# Patient Record
Sex: FEMALE | Race: BLACK OR AFRICAN AMERICAN | NOT HISPANIC OR LATINO | URBAN - METROPOLITAN AREA
[De-identification: names, ages, dates, MRNs, and addresses within clinical notes are randomized per-mention and may not be internally consistent; named-entity substitution may affect disease eponyms.]

---

## 2022-12-19 ENCOUNTER — HOSPITAL ENCOUNTER (OUTPATIENT)
Facility: HOSPITAL | Age: 62
Discharge: HOME | End: 2022-12-21
Attending: EMERGENCY MEDICINE | Admitting: HOSPITALIST
Payer: MEDICAID

## 2022-12-19 DIAGNOSIS — R07.9 CHEST PAIN: ICD-10-CM

## 2022-12-19 DIAGNOSIS — R79.89 D-DIMER, ELEVATED: ICD-10-CM

## 2022-12-19 DIAGNOSIS — R93.89 ABNORMAL CT SCAN: ICD-10-CM

## 2022-12-19 DIAGNOSIS — R04.2 COUGH WITH HEMOPTYSIS: ICD-10-CM

## 2022-12-19 DIAGNOSIS — R91.8 LUNG MASS: ICD-10-CM

## 2022-12-19 DIAGNOSIS — R04.2 HEMOPTYSIS: ICD-10-CM

## 2022-12-19 DIAGNOSIS — R06.2 WHEEZING: Primary | ICD-10-CM

## 2022-12-19 DIAGNOSIS — J40 BRONCHITIS: ICD-10-CM

## 2022-12-19 DIAGNOSIS — R06.02 SHORTNESS OF BREATH: ICD-10-CM

## 2022-12-19 DIAGNOSIS — I26.99 PULMONARY EMBOLISM: ICD-10-CM

## 2022-12-19 LAB
ALBUMIN SERPL BCP-MCNC: 3.4 G/DL (ref 3.5–5.2)
ALLENS TEST: ABNORMAL
ALP SERPL-CCNC: 73 U/L (ref 55–135)
ALT SERPL W/O P-5'-P-CCNC: 9 U/L (ref 10–44)
ANION GAP SERPL CALC-SCNC: 11 MMOL/L (ref 8–16)
AST SERPL-CCNC: 20 U/L (ref 10–40)
BASOPHILS # BLD AUTO: 0.04 K/UL (ref 0–0.2)
BASOPHILS NFR BLD: 0.5 % (ref 0–1.9)
BILIRUB SERPL-MCNC: 0.4 MG/DL (ref 0.1–1)
BNP SERPL-MCNC: 15 PG/ML (ref 0–99)
BUN SERPL-MCNC: 7 MG/DL (ref 8–23)
CALCIUM SERPL-MCNC: 9.5 MG/DL (ref 8.7–10.5)
CHLORIDE SERPL-SCNC: 99 MMOL/L (ref 95–110)
CO2 SERPL-SCNC: 26 MMOL/L (ref 23–29)
CREAT SERPL-MCNC: 1.2 MG/DL (ref 0.5–1.4)
D DIMER PPP IA.FEU-MCNC: 2.71 MG/L FEU
DELSYS: ABNORMAL
DIFFERENTIAL METHOD: ABNORMAL
EOSINOPHIL # BLD AUTO: 0.3 K/UL (ref 0–0.5)
EOSINOPHIL NFR BLD: 4.1 % (ref 0–8)
ERYTHROCYTE [DISTWIDTH] IN BLOOD BY AUTOMATED COUNT: 17.7 % (ref 11.5–14.5)
EST. GFR  (NO RACE VARIABLE): 51.2 ML/MIN/1.73 M^2
GLUCOSE SERPL-MCNC: 107 MG/DL (ref 70–110)
HCO3 UR-SCNC: 33.1 MMOL/L (ref 24–28)
HCT VFR BLD AUTO: 33.6 % (ref 37–48.5)
HCV AB SERPL QL IA: NORMAL
HGB BLD-MCNC: 9.6 G/DL (ref 12–16)
HIV 1+2 AB+HIV1 P24 AG SERPL QL IA: NORMAL
IMM GRANULOCYTES # BLD AUTO: 0.03 K/UL (ref 0–0.04)
IMM GRANULOCYTES NFR BLD AUTO: 0.4 % (ref 0–0.5)
INFLUENZA A, MOLECULAR: NOT DETECTED
INFLUENZA B, MOLECULAR: NOT DETECTED
LYMPHOCYTES # BLD AUTO: 2.6 K/UL (ref 1–4.8)
LYMPHOCYTES NFR BLD: 31.4 % (ref 18–48)
MCH RBC QN AUTO: 22.2 PG (ref 27–31)
MCHC RBC AUTO-ENTMCNC: 28.6 G/DL (ref 32–36)
MCV RBC AUTO: 78 FL (ref 82–98)
MONOCYTES # BLD AUTO: 0.7 K/UL (ref 0.3–1)
MONOCYTES NFR BLD: 8.4 % (ref 4–15)
NEUTROPHILS # BLD AUTO: 4.6 K/UL (ref 1.8–7.7)
NEUTROPHILS NFR BLD: 55.2 % (ref 38–73)
NRBC BLD-RTO: 0 /100 WBC
PCO2 BLDA: 55.3 MMHG (ref 35–45)
PH SMN: 7.39 [PH] (ref 7.35–7.45)
PLATELET # BLD AUTO: 367 K/UL (ref 150–450)
PMV BLD AUTO: 10.3 FL (ref 9.2–12.9)
PO2 BLDA: 39 MMHG (ref 40–60)
POC BE: 8 MMOL/L
POC SATURATED O2: 71 % (ref 95–100)
POC TCO2: 35 MMOL/L (ref 24–29)
POTASSIUM SERPL-SCNC: 4.2 MMOL/L (ref 3.5–5.1)
PROT SERPL-MCNC: 8.9 G/DL (ref 6–8.4)
RBC # BLD AUTO: 4.33 M/UL (ref 4–5.4)
RSV AG BY MOLECULAR METHOD: NOT DETECTED
SAMPLE: ABNORMAL
SARS-COV-2 RDRP RESP QL NAA+PROBE: NEGATIVE
SARS-COV-2 RNA RESP QL NAA+PROBE: NOT DETECTED
SITE: ABNORMAL
SODIUM SERPL-SCNC: 136 MMOL/L (ref 136–145)
TROPONIN I SERPL DL<=0.01 NG/ML-MCNC: <0.006 NG/ML (ref 0–0.03)
WBC # BLD AUTO: 8.26 K/UL (ref 3.9–12.7)

## 2022-12-19 PROCEDURE — 36410 VNPNXR 3YR/> PHY/QHP DX/THER: CPT | Mod: 59,,, | Performed by: EMERGENCY MEDICINE

## 2022-12-19 PROCEDURE — 93010 ELECTROCARDIOGRAM REPORT: CPT | Mod: ,,, | Performed by: INTERNAL MEDICINE

## 2022-12-19 PROCEDURE — 80053 COMPREHEN METABOLIC PANEL: CPT | Performed by: EMERGENCY MEDICINE

## 2022-12-19 PROCEDURE — 87389 HIV-1 AG W/HIV-1&-2 AB AG IA: CPT | Performed by: PHYSICIAN ASSISTANT

## 2022-12-19 PROCEDURE — 85379 FIBRIN DEGRADATION QUANT: CPT | Performed by: EMERGENCY MEDICINE

## 2022-12-19 PROCEDURE — 85025 COMPLETE CBC W/AUTO DIFF WBC: CPT | Performed by: EMERGENCY MEDICINE

## 2022-12-19 PROCEDURE — 94640 AIRWAY INHALATION TREATMENT: CPT

## 2022-12-19 PROCEDURE — 84484 ASSAY OF TROPONIN QUANT: CPT | Performed by: EMERGENCY MEDICINE

## 2022-12-19 PROCEDURE — 83880 ASSAY OF NATRIURETIC PEPTIDE: CPT | Performed by: EMERGENCY MEDICINE

## 2022-12-19 PROCEDURE — 76937 US GUIDE VASCULAR ACCESS: CPT | Mod: 26,,, | Performed by: EMERGENCY MEDICINE

## 2022-12-19 PROCEDURE — 93005 ELECTROCARDIOGRAM TRACING: CPT

## 2022-12-19 PROCEDURE — 94761 N-INVAS EAR/PLS OXIMETRY MLT: CPT

## 2022-12-19 PROCEDURE — 63600175 PHARM REV CODE 636 W HCPCS: Performed by: EMERGENCY MEDICINE

## 2022-12-19 PROCEDURE — 76937 PR  US GUIDE, VASCULAR ACCESS: ICD-10-PCS | Mod: 26,,, | Performed by: EMERGENCY MEDICINE

## 2022-12-19 PROCEDURE — U0002 COVID-19 LAB TEST NON-CDC: HCPCS | Performed by: PHYSICIAN ASSISTANT

## 2022-12-19 PROCEDURE — 99285 EMERGENCY DEPT VISIT HI MDM: CPT | Mod: 25

## 2022-12-19 PROCEDURE — 0241U SARS-COV2 (COVID) WITH FLU/RSV BY PCR: CPT | Performed by: EMERGENCY MEDICINE

## 2022-12-19 PROCEDURE — 93010 EKG 12-LEAD: ICD-10-PCS | Mod: ,,, | Performed by: INTERNAL MEDICINE

## 2022-12-19 PROCEDURE — 99900035 HC TECH TIME PER 15 MIN (STAT)

## 2022-12-19 PROCEDURE — 25000242 PHARM REV CODE 250 ALT 637 W/ HCPCS: Performed by: EMERGENCY MEDICINE

## 2022-12-19 PROCEDURE — 82803 BLOOD GASES ANY COMBINATION: CPT

## 2022-12-19 PROCEDURE — 99285 EMERGENCY DEPT VISIT HI MDM: CPT | Mod: 25,CS,, | Performed by: EMERGENCY MEDICINE

## 2022-12-19 PROCEDURE — 25500020 PHARM REV CODE 255: Performed by: EMERGENCY MEDICINE

## 2022-12-19 PROCEDURE — 86803 HEPATITIS C AB TEST: CPT | Performed by: PHYSICIAN ASSISTANT

## 2022-12-19 PROCEDURE — 36410 PR VENIPUNC NEED PHYS SKILL,DX OR RX: ICD-10-PCS | Mod: 59,,, | Performed by: EMERGENCY MEDICINE

## 2022-12-19 PROCEDURE — 99285 PR EMERGENCY DEPT VISIT,LEVEL V: ICD-10-PCS | Mod: 25,CS,, | Performed by: EMERGENCY MEDICINE

## 2022-12-19 RX ORDER — IPRATROPIUM BROMIDE AND ALBUTEROL SULFATE 2.5; .5 MG/3ML; MG/3ML
3 SOLUTION RESPIRATORY (INHALATION)
Status: COMPLETED | OUTPATIENT
Start: 2022-12-19 | End: 2022-12-19

## 2022-12-19 RX ORDER — ALBUTEROL SULFATE 2.5 MG/.5ML
5 SOLUTION RESPIRATORY (INHALATION)
Status: COMPLETED | OUTPATIENT
Start: 2022-12-19 | End: 2022-12-19

## 2022-12-19 RX ORDER — PREDNISONE 20 MG/1
60 TABLET ORAL
Status: COMPLETED | OUTPATIENT
Start: 2022-12-19 | End: 2022-12-19

## 2022-12-19 RX ADMIN — IPRATROPIUM BROMIDE AND ALBUTEROL SULFATE 3 ML: .5; 3 SOLUTION RESPIRATORY (INHALATION) at 03:12

## 2022-12-19 RX ADMIN — PREDNISONE 60 MG: 20 TABLET ORAL at 02:12

## 2022-12-19 RX ADMIN — ALBUTEROL SULFATE 5 MG: 2.5 SOLUTION RESPIRATORY (INHALATION) at 07:12

## 2022-12-19 RX ADMIN — IPRATROPIUM BROMIDE AND ALBUTEROL SULFATE 3 ML: 2.5; .5 SOLUTION RESPIRATORY (INHALATION) at 01:12

## 2022-12-19 RX ADMIN — IOHEXOL 100 ML: 350 INJECTION, SOLUTION INTRAVENOUS at 09:12
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
## 2022-12-20 PROBLEM — E66.01 MORBID OBESITY WITH BODY MASS INDEX OF 60.0-69.9 IN ADULT: Chronic | Status: ACTIVE | Noted: 2022-12-20

## 2022-12-20 PROBLEM — I10 HYPERTENSION: Chronic | Status: ACTIVE | Noted: 2022-12-20

## 2022-12-20 PROBLEM — R91.8 MASS OF RIGHT LUNG: Status: ACTIVE | Noted: 2022-12-20

## 2022-12-20 PROBLEM — D86.9 SARCOIDOSIS: Status: ACTIVE | Noted: 2022-12-20

## 2022-12-20 PROBLEM — E66.01 MORBID OBESITY WITH BODY MASS INDEX OF 60.0-69.9 IN ADULT: Status: ACTIVE | Noted: 2022-12-20

## 2022-12-20 PROBLEM — R06.09 DYSPNEA ON EXERTION: Status: ACTIVE | Noted: 2022-12-20

## 2022-12-20 PROBLEM — J44.9 COPD (CHRONIC OBSTRUCTIVE PULMONARY DISEASE): Status: ACTIVE | Noted: 2022-12-20

## 2022-12-20 PROBLEM — I10 HYPERTENSION: Status: ACTIVE | Noted: 2022-12-20

## 2022-12-20 PROBLEM — R04.2 HEMOPTYSIS: Status: ACTIVE | Noted: 2022-12-20

## 2022-12-20 PROBLEM — D64.9 ANEMIA: Status: ACTIVE | Noted: 2022-12-20

## 2022-12-20 LAB
ALBUMIN SERPL BCP-MCNC: 3.4 G/DL (ref 3.5–5.2)
ALP SERPL-CCNC: 75 U/L (ref 55–135)
ALT SERPL W/O P-5'-P-CCNC: 10 U/L (ref 10–44)
ANION GAP SERPL CALC-SCNC: 12 MMOL/L (ref 8–16)
ASCENDING AORTA: 2.7 CM
AST SERPL-CCNC: 15 U/L (ref 10–40)
AV INDEX (PROSTH): 0.94
AV MEAN GRADIENT: 2 MMHG
AV PEAK GRADIENT: 3 MMHG
AV VALVE AREA: 4.36 CM2
AV VELOCITY RATIO: 0.85
BASOPHILS # BLD AUTO: 0.01 K/UL (ref 0–0.2)
BASOPHILS NFR BLD: 0.1 % (ref 0–1.9)
BILIRUB SERPL-MCNC: 0.3 MG/DL (ref 0.1–1)
BSA FOR ECHO PROCEDURE: 2.91 M2
BUN SERPL-MCNC: 13 MG/DL (ref 8–23)
CALCIUM SERPL-MCNC: 9.5 MG/DL (ref 8.7–10.5)
CHLORIDE SERPL-SCNC: 101 MMOL/L (ref 95–110)
CO2 SERPL-SCNC: 24 MMOL/L (ref 23–29)
CREAT SERPL-MCNC: 1.3 MG/DL (ref 0.5–1.4)
CV ECHO LV RWT: 0.33 CM
DIFFERENTIAL METHOD: ABNORMAL
DOP CALC AO PEAK VEL: 0.91 M/S
DOP CALC AO VTI: 16.42 CM
DOP CALC LVOT AREA: 4.6 CM2
DOP CALC LVOT DIAMETER: 2.43 CM
DOP CALC LVOT PEAK VEL: 0.77 M/S
DOP CALC LVOT STROKE VOLUME: 71.66 CM3
DOP CALCLVOT PEAK VEL VTI: 15.46 CM
E WAVE DECELERATION TIME: 300.51 MSEC
E/A RATIO: 0.93
E/E' RATIO: 10.27 M/S
ECHO LV POSTERIOR WALL: 0.87 CM (ref 0.6–1.1)
EJECTION FRACTION: 65 %
EOSINOPHIL # BLD AUTO: 0 K/UL (ref 0–0.5)
EOSINOPHIL NFR BLD: 0.1 % (ref 0–8)
ERYTHROCYTE [DISTWIDTH] IN BLOOD BY AUTOMATED COUNT: 17.8 % (ref 11.5–14.5)
EST. GFR  (NO RACE VARIABLE): 46.5 ML/MIN/1.73 M^2
ESTIMATED AVG GLUCOSE: 120 MG/DL (ref 68–131)
FERRITIN SERPL-MCNC: 15 NG/ML (ref 20–300)
FOLATE SERPL-MCNC: 5.2 NG/ML (ref 4–24)
FRACTIONAL SHORTENING: 29 % (ref 28–44)
GLUCOSE SERPL-MCNC: 145 MG/DL (ref 70–110)
HBA1C MFR BLD: 5.8 % (ref 4–5.6)
HCT VFR BLD AUTO: 34.8 % (ref 37–48.5)
HGB BLD-MCNC: 10.1 G/DL (ref 12–16)
IMM GRANULOCYTES # BLD AUTO: 0.04 K/UL (ref 0–0.04)
IMM GRANULOCYTES NFR BLD AUTO: 0.4 % (ref 0–0.5)
INTERVENTRICULAR SEPTUM: 0.66 CM (ref 0.6–1.1)
IRON SERPL-MCNC: 31 UG/DL (ref 30–160)
LA MAJOR: 4.08 CM
LA WIDTH: 3.59 CM
LEFT ATRIUM SIZE: 3.08 CM
LEFT ATRIUM VOLUME INDEX MOD: 13.3 ML/M2
LEFT ATRIUM VOLUME MOD: 35.79 CM3
LEFT INTERNAL DIMENSION IN SYSTOLE: 3.72 CM (ref 2.1–4)
LEFT VENTRICLE DIASTOLIC VOLUME INDEX: 48.38 ML/M2
LEFT VENTRICLE DIASTOLIC VOLUME: 130.15 ML
LEFT VENTRICLE MASS INDEX: 51 G/M2
LEFT VENTRICLE SYSTOLIC VOLUME INDEX: 21.9 ML/M2
LEFT VENTRICLE SYSTOLIC VOLUME: 58.93 ML
LEFT VENTRICULAR INTERNAL DIMENSION IN DIASTOLE: 5.21 CM (ref 3.5–6)
LEFT VENTRICULAR MASS: 137.68 G
LV LATERAL E/E' RATIO: 9.63 M/S
LV SEPTAL E/E' RATIO: 11 M/S
LYMPHOCYTES # BLD AUTO: 1.4 K/UL (ref 1–4.8)
LYMPHOCYTES NFR BLD: 14.2 % (ref 18–48)
MAGNESIUM SERPL-MCNC: 1.9 MG/DL (ref 1.6–2.6)
MCH RBC QN AUTO: 21.9 PG (ref 27–31)
MCHC RBC AUTO-ENTMCNC: 29 G/DL (ref 32–36)
MCV RBC AUTO: 75 FL (ref 82–98)
MONOCYTES # BLD AUTO: 0.4 K/UL (ref 0.3–1)
MONOCYTES NFR BLD: 3.6 % (ref 4–15)
MV PEAK A VEL: 0.83 M/S
MV PEAK E VEL: 0.77 M/S
MV STENOSIS PRESSURE HALF TIME: 87.15 MS
MV VALVE AREA P 1/2 METHOD: 2.52 CM2
NEUTROPHILS # BLD AUTO: 8 K/UL (ref 1.8–7.7)
NEUTROPHILS NFR BLD: 81.6 % (ref 38–73)
NRBC BLD-RTO: 0 /100 WBC
PHOSPHATE SERPL-MCNC: 2.1 MG/DL (ref 2.7–4.5)
PLATELET # BLD AUTO: 333 K/UL (ref 150–450)
PLATELET BLD QL SMEAR: ABNORMAL
PMV BLD AUTO: 10.4 FL (ref 9.2–12.9)
POTASSIUM SERPL-SCNC: 4.5 MMOL/L (ref 3.5–5.1)
PROCALCITONIN SERPL IA-MCNC: 0.02 NG/ML
PROT SERPL-MCNC: 9 G/DL (ref 6–8.4)
RA MAJOR: 4.75 CM
RA WIDTH: 4.03 CM
RBC # BLD AUTO: 4.62 M/UL (ref 4–5.4)
RETICS/RBC NFR AUTO: 1.3 % (ref 0.5–2.5)
RIGHT VENTRICULAR END-DIASTOLIC DIMENSION: 3.22 CM
RV TISSUE DOPPLER FREE WALL SYSTOLIC VELOCITY 1 (APICAL 4 CHAMBER VIEW): 13.62 CM/S
SATURATED IRON: 7 % (ref 20–50)
SINUS: 2.96 CM
SODIUM SERPL-SCNC: 137 MMOL/L (ref 136–145)
STJ: 2.81 CM
TDI LATERAL: 0.08 M/S
TDI SEPTAL: 0.07 M/S
TDI: 0.08 M/S
TOTAL IRON BINDING CAPACITY: 414 UG/DL (ref 250–450)
TRANSFERRIN SERPL-MCNC: 280 MG/DL (ref 200–375)
TRICUSPID ANNULAR PLANE SYSTOLIC EXCURSION: 1.95 CM
VIT B12 SERPL-MCNC: 436 PG/ML (ref 210–950)
WBC # BLD AUTO: 9.85 K/UL (ref 3.9–12.7)

## 2022-12-20 PROCEDURE — 25000242 PHARM REV CODE 250 ALT 637 W/ HCPCS

## 2022-12-20 PROCEDURE — 83036 HEMOGLOBIN GLYCOSYLATED A1C: CPT

## 2022-12-20 PROCEDURE — 94640 AIRWAY INHALATION TREATMENT: CPT

## 2022-12-20 PROCEDURE — 96372 THER/PROPH/DIAG INJ SC/IM: CPT

## 2022-12-20 PROCEDURE — 83735 ASSAY OF MAGNESIUM: CPT

## 2022-12-20 PROCEDURE — 84100 ASSAY OF PHOSPHORUS: CPT

## 2022-12-20 PROCEDURE — 80053 COMPREHEN METABOLIC PANEL: CPT

## 2022-12-20 PROCEDURE — 63600175 PHARM REV CODE 636 W HCPCS

## 2022-12-20 PROCEDURE — 82746 ASSAY OF FOLIC ACID SERUM: CPT

## 2022-12-20 PROCEDURE — G0378 HOSPITAL OBSERVATION PER HR: HCPCS

## 2022-12-20 PROCEDURE — 84466 ASSAY OF TRANSFERRIN: CPT

## 2022-12-20 PROCEDURE — 99900035 HC TECH TIME PER 15 MIN (STAT)

## 2022-12-20 PROCEDURE — 82607 VITAMIN B-12: CPT

## 2022-12-20 PROCEDURE — 25500020 PHARM REV CODE 255: Performed by: HOSPITALIST

## 2022-12-20 PROCEDURE — 85025 COMPLETE CBC W/AUTO DIFF WBC: CPT

## 2022-12-20 PROCEDURE — 27000221 HC OXYGEN, UP TO 24 HOURS

## 2022-12-20 PROCEDURE — 94761 N-INVAS EAR/PLS OXIMETRY MLT: CPT

## 2022-12-20 PROCEDURE — 84145 PROCALCITONIN (PCT): CPT

## 2022-12-20 PROCEDURE — 99204 PR OFFICE/OUTPT VISIT, NEW, LEVL IV, 45-59 MIN: ICD-10-PCS | Mod: ,,, | Performed by: INTERNAL MEDICINE

## 2022-12-20 PROCEDURE — 85045 AUTOMATED RETICULOCYTE COUNT: CPT

## 2022-12-20 PROCEDURE — 25000003 PHARM REV CODE 250

## 2022-12-20 PROCEDURE — 99220 PR INITIAL OBSERVATION CARE,LEVL III: CPT | Mod: ,,, | Performed by: HOSPITALIST

## 2022-12-20 PROCEDURE — 99220 PR INITIAL OBSERVATION CARE,LEVL III: ICD-10-PCS | Mod: ,,, | Performed by: HOSPITALIST

## 2022-12-20 PROCEDURE — 82728 ASSAY OF FERRITIN: CPT

## 2022-12-20 PROCEDURE — 94640 AIRWAY INHALATION TREATMENT: CPT | Mod: XB

## 2022-12-20 PROCEDURE — 99204 OFFICE O/P NEW MOD 45 MIN: CPT | Mod: ,,, | Performed by: INTERNAL MEDICINE

## 2022-12-20 RX ORDER — GLUCAGON 1 MG
1 KIT INJECTION
Status: DISCONTINUED | OUTPATIENT
Start: 2022-12-20 | End: 2022-12-21 | Stop reason: HOSPADM

## 2022-12-20 RX ORDER — ALBUTEROL SULFATE 0.83 MG/ML
2.5 SOLUTION RESPIRATORY (INHALATION) EVERY 6 HOURS PRN
COMMUNITY
Start: 2022-11-04

## 2022-12-20 RX ORDER — HYDROCHLOROTHIAZIDE 12.5 MG/1
25 TABLET ORAL DAILY
Status: DISCONTINUED | OUTPATIENT
Start: 2022-12-20 | End: 2022-12-21 | Stop reason: HOSPADM

## 2022-12-20 RX ORDER — ALBUTEROL SULFATE 90 UG/1
2 AEROSOL, METERED RESPIRATORY (INHALATION) EVERY 6 HOURS PRN
Status: DISCONTINUED | OUTPATIENT
Start: 2022-12-20 | End: 2022-12-21

## 2022-12-20 RX ORDER — IBUPROFEN 200 MG
24 TABLET ORAL
Status: DISCONTINUED | OUTPATIENT
Start: 2022-12-20 | End: 2022-12-21 | Stop reason: HOSPADM

## 2022-12-20 RX ORDER — NALOXONE HCL 0.4 MG/ML
0.02 VIAL (ML) INJECTION
Status: DISCONTINUED | OUTPATIENT
Start: 2022-12-20 | End: 2022-12-21 | Stop reason: HOSPADM

## 2022-12-20 RX ORDER — SODIUM CHLORIDE 0.9 % (FLUSH) 0.9 %
10 SYRINGE (ML) INJECTION EVERY 12 HOURS PRN
Status: DISCONTINUED | OUTPATIENT
Start: 2022-12-20 | End: 2022-12-21 | Stop reason: HOSPADM

## 2022-12-20 RX ORDER — ONDANSETRON 2 MG/ML
4 INJECTION INTRAMUSCULAR; INTRAVENOUS EVERY 8 HOURS PRN
Status: DISCONTINUED | OUTPATIENT
Start: 2022-12-20 | End: 2022-12-21 | Stop reason: HOSPADM

## 2022-12-20 RX ORDER — ENOXAPARIN SODIUM 100 MG/ML
40 INJECTION SUBCUTANEOUS EVERY 24 HOURS
Status: DISCONTINUED | OUTPATIENT
Start: 2022-12-20 | End: 2022-12-20

## 2022-12-20 RX ORDER — LEVOTHYROXINE SODIUM 100 UG/1
1 CAPSULE ORAL DAILY
COMMUNITY
Start: 2022-12-08

## 2022-12-20 RX ORDER — MOMETASONE FUROATE AND FORMOTEROL FUMARATE DIHYDRATE 200; 5 UG/1; UG/1
2 AEROSOL RESPIRATORY (INHALATION) 2 TIMES DAILY
COMMUNITY

## 2022-12-20 RX ORDER — ACETAMINOPHEN 325 MG/1
650 TABLET ORAL EVERY 4 HOURS PRN
Status: DISCONTINUED | OUTPATIENT
Start: 2022-12-20 | End: 2022-12-21 | Stop reason: HOSPADM

## 2022-12-20 RX ORDER — MELOXICAM 15 MG/1
15 TABLET ORAL DAILY
COMMUNITY

## 2022-12-20 RX ORDER — ENOXAPARIN SODIUM 100 MG/ML
60 INJECTION SUBCUTANEOUS EVERY 12 HOURS
Status: DISCONTINUED | OUTPATIENT
Start: 2022-12-20 | End: 2022-12-21 | Stop reason: HOSPADM

## 2022-12-20 RX ORDER — TRIAMTERENE/HYDROCHLOROTHIAZID 37.5-25 MG
1 TABLET ORAL DAILY
COMMUNITY

## 2022-12-20 RX ORDER — IPRATROPIUM BROMIDE AND ALBUTEROL SULFATE 2.5; .5 MG/3ML; MG/3ML
3 SOLUTION RESPIRATORY (INHALATION)
Status: DISCONTINUED | OUTPATIENT
Start: 2022-12-20 | End: 2022-12-21

## 2022-12-20 RX ORDER — IPRATROPIUM BROMIDE AND ALBUTEROL SULFATE 2.5; .5 MG/3ML; MG/3ML
3 SOLUTION RESPIRATORY (INHALATION) EVERY 4 HOURS PRN
Status: DISCONTINUED | OUTPATIENT
Start: 2022-12-20 | End: 2022-12-21 | Stop reason: HOSPADM

## 2022-12-20 RX ORDER — IBUPROFEN 200 MG
16 TABLET ORAL
Status: DISCONTINUED | OUTPATIENT
Start: 2022-12-20 | End: 2022-12-21 | Stop reason: HOSPADM

## 2022-12-20 RX ORDER — ALBUTEROL SULFATE 90 UG/1
2 AEROSOL, METERED RESPIRATORY (INHALATION) EVERY 6 HOURS PRN
COMMUNITY

## 2022-12-20 RX ORDER — IPRATROPIUM BROMIDE AND ALBUTEROL SULFATE 2.5; .5 MG/3ML; MG/3ML
SOLUTION RESPIRATORY (INHALATION)
Status: COMPLETED
Start: 2022-12-20 | End: 2022-12-20

## 2022-12-20 RX ADMIN — ENOXAPARIN SODIUM 60 MG: 40 INJECTION SUBCUTANEOUS at 09:12

## 2022-12-20 RX ADMIN — IPRATROPIUM BROMIDE AND ALBUTEROL SULFATE 3 ML: 2.5; .5 SOLUTION RESPIRATORY (INHALATION) at 02:12

## 2022-12-20 RX ADMIN — DIBASIC SODIUM PHOSPHATE, MONOBASIC POTASSIUM PHOSPHATE AND MONOBASIC SODIUM PHOSPHATE 2 TABLET: 852; 155; 130 TABLET ORAL at 09:12

## 2022-12-20 RX ADMIN — HYDROCHLOROTHIAZIDE 25 MG: 25 TABLET ORAL at 09:12

## 2022-12-20 RX ADMIN — IPRATROPIUM BROMIDE AND ALBUTEROL SULFATE 3 ML: .5; 3 SOLUTION RESPIRATORY (INHALATION) at 03:12

## 2022-12-20 RX ADMIN — IPRATROPIUM BROMIDE AND ALBUTEROL SULFATE 3 ML: 2.5; .5 SOLUTION RESPIRATORY (INHALATION) at 07:12

## 2022-12-20 RX ADMIN — ENOXAPARIN SODIUM 60 MG: 40 INJECTION SUBCUTANEOUS at 10:12

## 2022-12-20 RX ADMIN — HUMAN ALBUMIN MICROSPHERES AND PERFLUTREN 0.66 MG: 10; .22 INJECTION, SOLUTION INTRAVENOUS at 09:12

## 2022-12-20 NOTE — HPI
"Ara Duffy is a 62 year old woman with ?sarcoidosis, asthma/COPD, HTN, who presents for hemoptysis and SIMONS that started Thursday. Patient has never been in the hospital and receives most of her care at [facility], which we cannot see.     Since Thursday patient has noted small streaks of blood in her sputum, about <1 tsp. Denies shortness of breath at rest, but has SIMONS which is normal for her. Patient has not noted significant changes in the quality of sputum otherwise, eg quantity or colour. Denies fevers, chills, weight loss recently, night sweats, but notes some decreased appetite in the last month. Patient unable to clarify her diagnosis of sarcoidosis. She states that sarcoidosis has affected parts of her skin by causing excess tissue, but cannot say if she has lung or cardiac involvement of sarcoid.    Patient is an ex smoker with about 60 pack years, quit October this year. Does not drink or use other drugs. She has never undergone cancer screening but there does not seem to be a strong family history of cancer. She says she was supposed to get a "lung scan" done at [facility] next year. She lives with her daughter, who performs most of her ADLs for her. At baseline she is not very mobile.    In the ED, pt was hypertensive but had stable vitals. Labs notable for anemia and D dimer elevation >2. Given her reported history, there was concern for PE. CTA was completed but nondiagnostic owing to contrast extravasation. CT did however, show a new R lung mass: Large spiculated masslike opacity in the right upper lobe with surrounding tree-in-bud nodularity extending to the right lung apex, and occlusion of the right upper lobe bronchus.  Findings are concerning for a neoplastic process.  Recommend further evaluation with PET-CT/tissue sampling, versus three-month follow-up chest CT at a minimum.    Given her likely malignancy and concern for PE, patient was admitted to medicine for observation.  "

## 2022-12-20 NOTE — ASSESSMENT & PLAN NOTE
61 yo F with unclear PMHx including asthma vs COPD, sarcoidosis, presenting with several days of SOB/SIMONS and hemoptysis. However on my interview, patient says her SOB is at baseline. She is usually not very mobile and gets SIMONS easily, so this is not new for her. O2sat within normal with her very likely diagnosis of COPD.     Her CTA findings are nondiagnostic for PE but highly suggestive of lung cancer, which in turn would increase the risk of PE. USS DVT study negative. D-dimer elevation can be explained by possible lung cancer.  Her SOB/SIMONS is probably a mix of her existing lung disease, deconditioning, obesity hypoventilation, and ?cancer.   Patient appears clinically well.  My suspicion for PE is moderate at best.  If she has a PE, PESI score is 102 which is intermediate risk.  Troponin and BNP wnl    Plan  - DVT prophylaxis for now, electing not to start therapeutic anticoagulation as patient appears clinically well  - Has received IV suboptimal contrast 2x today  - Needs to wait another 24hr before attempting CTA again  - VQ scan would not be helpful in this patient  - Echocardiogram for ?right heart strain, a bedside echo could not be done given body habitus  - Duonebs Q6

## 2022-12-20 NOTE — ASSESSMENT & PLAN NOTE
Not sure about this diagnosis, as no prior records can be viewed, pt unable to tell me with any certainty which organs are affected.  - Currently not on therapy, has not been treated in 20 years per patient  - F/u with her provider at [facility]  - Do not think her lung mass is sarcoid

## 2022-12-20 NOTE — ED NOTES
Anesthesia Evaluation     Patient summary reviewed and Nursing notes reviewed   no history of anesthetic complications:  NPO Solid Status: > 8 hours  NPO Liquid Status: > 2 hours           Airway   Mallampati: I  TM distance: >3 FB  Neck ROM: full  No difficulty expected  Dental      Pulmonary    (-) sleep apnea, not a smoker  Cardiovascular   Exercise tolerance: good (4-7 METS)    (-) hypertension, CAD      Neuro/Psych  GI/Hepatic/Renal/Endo    (-) liver disease, no renal disease, diabetes    Musculoskeletal     Abdominal    Substance History      OB/GYN          Other   arthritis,                      Anesthesia Plan    ASA 1     MAC     intravenous induction     Anesthetic plan, all risks, benefits, and alternatives have been provided, discussed and informed consent has been obtained with: patient.       Spoke with ANA Gordon who states she will call back shortly for report as nurse is assisting another patient at this moment.

## 2022-12-20 NOTE — PROVIDER PROGRESS NOTES - EMERGENCY DEPT.
Encounter Date: 12/19/2022    ED Physician Progress Notes        Physician Note:   Received patient at sign-out    Labs remarkable for an elevated D-dimer.  CTA chest ordered.  Patient seen and examined by me and informed of plan.  She has minimally increased work of breathing on RA.  Will administer breathing treatment.    Reassessment:  CTA limited by contrast bolus timing.  Bilateral lower extremity venous ultrasounds ordered.  On repeat assessment, patient reports some improvement with breathing treatments.  She was informed of the mass on the CT in the importance of outpatient follow-up.  At this time, my shift is coming to a close and the patient was signed out to incoming MD.

## 2022-12-20 NOTE — ED NOTES
Pt PIV infiltrated in CT scan. US trained RN at bedside. Ct able to obtain dry scan at this time.

## 2022-12-20 NOTE — PROVIDER PROGRESS NOTES - EMERGENCY DEPT.
"Signout Note    I received signout from the previous provider.     Chief complaint:  Hemoptysis (Pt c/o "coughing up blood, SOB and headache" since Thursday.  )      Pertinent history &exam:  Ara Duffy is a 62 y.o. female with pertinent PMH of COPD, hypertension, morbid obesity, sarcoidosis who presented to emergency department with complaint of hemoptysis.  She was apparently also hypoxic.  Two attempts at CT PE were done, both with contrast extravasation.  Unable to get additional CT PE according to sign-out.  She did have an elevated D-dimer.  Her CT scan showed a spiculated lung mass.  She was signed out pending bilateral lower extremity ultrasounds.    Vitals:    12/20/22 0303   BP: (!) 152/74   Pulse: 94   Resp: (!) 23   Temp: 98.2 °F (36.8 °C)       Imaging Studies:    US Lower Extremity Veins Bilateral   Final Result      No evidence of deep venous thrombosis in either lower extremity.         Electronically signed by: Hudson Darnell MD   Date:    12/20/2022   Time:    00:53      CTA Chest Non-Coronary (PE Studies)   Final Result      1. Examination is nondiagnostic for pulmonary embolism.  Consider repeating the study if clinically indicated.   2. Large spiculated masslike opacity in the right upper lobe with surrounding tree-in-bud nodularity extending to the right lung apex, and occlusion of the right upper lobe bronchus.  Findings are concerning for a neoplastic process.  Recommend further evaluation with PET-CT/tissue sampling, versus three-month follow-up chest CT at a minimum.         Electronically signed by: Kd Farr   Date:    12/19/2022   Time:    22:17      X-Ray Chest AP Portable   Final Result      Right midlung zone opacity concerning for airspace disease including pneumonia in this patient with shortness of breath.  Short-term follow-up chest radiography after therapy is recommended to ensure resolution.      Left costophrenic angle not well visualized which may be related to artifact " versus trace pleural fluid.         Electronically signed by: Camacho Lemus MD   Date:    12/19/2022   Time:    18:29          Medications Given:  Medications   albuterol-ipratropium 2.5 mg-0.5 mg/3 mL nebulizer solution 3 mL (has no administration in time range)   sodium chloride 0.9% flush 10 mL (has no administration in time range)   naloxone 0.4 mg/mL injection 0.02 mg (has no administration in time range)   glucose chewable tablet 16 g (has no administration in time range)   glucose chewable tablet 24 g (has no administration in time range)   glucagon (human recombinant) injection 1 mg (has no administration in time range)   dextrose 10% bolus 125 mL 125 mL (has no administration in time range)   dextrose 10% bolus 250 mL 250 mL (has no administration in time range)   ondansetron injection 4 mg (has no administration in time range)   acetaminophen tablet 650 mg (has no administration in time range)   albuterol-ipratropium 2.5 mg-0.5 mg/3 mL nebulizer solution 3 mL (has no administration in time range)   enoxaparin injection 60 mg (has no administration in time range)   hydroCHLOROthiazide tablet 25 mg (has no administration in time range)   albuterol-ipratropium 2.5 mg-0.5 mg/3 mL nebulizer solution 3 mL (3 mLs Nebulization Given 12/19/22 1331)   predniSONE tablet 60 mg (60 mg Oral Given 12/19/22 1403)   albuterol-ipratropium 2.5 mg-0.5 mg/3 mL nebulizer solution 3 mL (3 mLs Nebulization Given 12/19/22 1515)   albuterol sulfate nebulizer solution 5 mg (5 mg Nebulization Given 12/19/22 1917)   iohexoL (OMNIPAQUE 350) injection 125 mL (100 mLs Intravenous Given 12/19/22 2104)   albuterol-ipratropium (DUO-NEB) 2.5 mg-0.5 mg/3 mL nebulizer solution (3 mLs  Given 12/20/22 0302)       Pending Items/ MDM:  62 y.o. female with above complaint.  Ultrasounds negative.  Given that we have not actually ruled out pulmonary embolism, I do not feel comfortable discharging her home.  I feel she is actually rather high risk  given her spiculated lung mass.  Plan observation to Internal Medicine for reimaging.    Diagnostic Impression:    1. Wheezing    2. Shortness of breath    3. Cough with hemoptysis    4. D-dimer, elevated    5. Bronchitis    6. Abnormal CT scan    7. Lung mass    8. Chest pain    9. Pulmonary embolism         ED Disposition Condition    Observation Stable               Patient  understands the plan and is in agreement, verbalized understanding, questions answered    Ericka Bonner MD  Emergency Medicine

## 2022-12-20 NOTE — ASSESSMENT & PLAN NOTE
Pt reports she takes HCTZ-triamterene at home for fluid in her legs, though her BP is significantly elevated and she looks euvolemic to me. Last took this on Thursday.     - Resume HCTZ 25mg daily  - Pt reports some incontinence, if HCTZ cannot be tolerated, could try CCB instead  - Monitor renal function, we have no prior and her admission Cr is 1.2

## 2022-12-20 NOTE — H&P
"[facility] - Emergency Dept  [facility] Medicine  History & Physical    Patient Name: Ara Duffy  MRN: [id number]  Patient Class: OP- Observation  Admission Date: 12/19/2022  Attending Physician: Beto Dietz MD   Primary Care Provider: No primary care provider on file.         Patient information was obtained from patient and ER records.     Subjective:     Principal Problem:Dyspnea on exertion    Chief Complaint:   Chief Complaint   Patient presents with    Hemoptysis     Pt c/o "coughing up blood, SOB and headache" since Thursday.          HPI: Ara Duffy is a 62 year old woman with ?sarcoidosis, asthma/COPD, HTN, who presents for hemoptysis and SIMONS that started Thursday. Patient has never been in the hospital and receives most of her care at [facility], which we cannot see.     Since Thursday patient has noted small streaks of blood in her sputum, about <1 tsp. Denies shortness of breath at rest, but has SIMONS which is normal for her. Patient has not noted significant changes in the quality of sputum otherwise, eg quantity or colour. Denies fevers, chills, weight loss recently, night sweats, but notes some decreased appetite in the last month. Patient unable to clarify her diagnosis of sarcoidosis. She states that sarcoidosis has affected parts of her skin by causing excess tissue, but cannot say if she has lung or cardiac involvement of sarcoid.    Patient is an ex smoker with about 60 pack years, quit October this year. Does not drink or use other drugs. She has never undergone cancer screening but there does not seem to be a strong family history of cancer. She says she was supposed to get a "lung scan" done at [facility] next year. She lives with her daughter, who performs most of her ADLs for her. At baseline she is not very mobile.    In the ED, pt was hypertensive but had stable vitals. Labs notable for anemia and D dimer elevation >2. Given her reported history, there was concern for PE. CTA was completed but " nondiagnostic owing to contrast extravasation. CT did however, show a new R lung mass: Large spiculated masslike opacity in the right upper lobe with surrounding tree-in-bud nodularity extending to the right lung apex, and occlusion of the right upper lobe bronchus.  Findings are concerning for a neoplastic process.  Recommend further evaluation with PET-CT/tissue sampling, versus three-month follow-up chest CT at a minimum.    Given her likely malignancy and concern for PE, patient was admitted to medicine for observation.      Past Medical History:   Diagnosis Date    COPD (chronic obstructive pulmonary disease)     Essential (primary) hypertension     Mild intermittent asthma, uncomplicated     Obesity, unspecified     Sarcoidosis, unspecified        History reviewed. No pertinent surgical history.    Review of patient's allergies indicates:  No Known Allergies    No current facility-administered medications on file prior to encounter.     Current Outpatient Medications on File Prior to Encounter   Medication Sig    albuterol (PROVENTIL HFA) 90 mcg/actuation inhaler Inhale 2 puffs into the lungs every 6 (six) hours as needed for Wheezing. Rescue    meloxicam (MOBIC) 15 MG tablet Take 15 mg by mouth once daily.    mometasone-formoterol (DULERA) 200-5 mcg/actuation inhaler Inhale 2 puffs into the lungs 2 (two) times daily. Controller    triamterene-hydrochlorothiazide 37.5-25 mg (MAXZIDE-25) 37.5-25 mg per tablet Take 1 tablet by mouth once daily.     Family History       Problem Relation (Age of Onset)    No Known Problems Mother, Father          Tobacco Use    Smoking status: Former     Packs/day: 2.00     Years: 30.00     Pack years: 60.00     Types: Cigarettes     Quit date: 10/10/2022     Years since quittin.1    Smokeless tobacco: Never   Substance and Sexual Activity    Alcohol use: Not Currently    Drug use: Not on file    Sexual activity: Not on file     Review of Systems   Constitutional:  Negative  for activity change, chills, fatigue, fever and unexpected weight change.   HENT: Negative.     Eyes: Negative.    Respiratory:  Positive for shortness of breath.         Hemoptysis   Cardiovascular: Negative.    Gastrointestinal:  Negative for abdominal distention and abdominal pain.   Genitourinary:  Positive for frequency (urinary incontinence). Negative for difficulty urinating.   Musculoskeletal: Negative.    Neurological: Negative.    Psychiatric/Behavioral: Negative.     Objective:     Vital Signs (Most Recent):  Temp: 98.2 °F (36.8 °C) (12/20/22 0303)  Pulse: 94 (12/20/22 0303)  Resp: (!) 23 (12/20/22 0303)  BP: (!) 152/74 (12/20/22 0303)  SpO2: 100 % (12/20/22 0303)   Vital Signs (24h Range):  Temp:  [97.8 °F (36.6 °C)-98.2 °F (36.8 °C)] 98.2 °F (36.8 °C)  Pulse:  [85-99] 94  Resp:  [16-24] 23  SpO2:  [91 %-100 %] 100 %  BP: (152-189)/(74-93) 152/74     Weight: (!) 181.9 kg (401 lb)  Body mass index is 64.72 kg/m².    Physical Exam  Constitutional:       General: She is not in acute distress.     Appearance: She is obese. She is not ill-appearing.   HENT:      Mouth/Throat:      Mouth: Mucous membranes are moist.   Eyes:      Extraocular Movements: Extraocular movements intact.      Pupils: Pupils are equal, round, and reactive to light.   Cardiovascular:      Rate and Rhythm: Normal rate and regular rhythm.      Pulses: Normal pulses.      Heart sounds: Normal heart sounds.   Pulmonary:      Effort: Pulmonary effort is normal. No respiratory distress.      Breath sounds: Wheezing (bilaterally, R>L) present. No rhonchi or rales.   Abdominal:      Palpations: Abdomen is soft.      Tenderness: There is no abdominal tenderness.   Musculoskeletal:         General: No swelling.      Right lower leg: No edema.      Left lower leg: No edema.   Skin:     General: Skin is warm and dry.   Neurological:      Mental Status: She is alert and oriented to person, place, and time. Mental status is at baseline.    Psychiatric:         Mood and Affect: Mood normal.         CRANIAL NERVES     CN III, IV, VI   Pupils are equal, round, and reactive to light.     Significant Labs: All pertinent labs within the past 24 hours have been reviewed.  CBC:   Recent Labs   Lab 12/19/22  1356   WBC 8.26   HGB 9.6*   HCT 33.6*        CMP:   Recent Labs   Lab 12/19/22  1356      K 4.2   CL 99   CO2 26      BUN 7*   CREATININE 1.2   CALCIUM 9.5   PROT 8.9*   ALBUMIN 3.4*   BILITOT 0.4   ALKPHOS 73   AST 20   ALT 9*   ANIONGAP 11     Troponin:   Recent Labs   Lab 12/19/22  1356   TROPONINI <0.006       Significant Imaging: I have reviewed all pertinent imaging results/findings within the past 24 hours.    CTA chest non coronary:  Impression:     1. Examination is nondiagnostic for pulmonary embolism.  Consider repeating the study if clinically indicated.  2. Large spiculated masslike opacity in the right upper lobe with surrounding tree-in-bud nodularity extending to the right lung apex, and occlusion of the right upper lobe bronchus.  Findings are concerning for a neoplastic process.  Recommend further evaluation with PET-CT/tissue sampling, versus three-month follow-up chest CT at a minimum.    Assessment/Plan:     * Dyspnea on exertion  63 yo F with unclear PMHx including asthma vs COPD, sarcoidosis, presenting with several days of SOB/SIMONS and hemoptysis. However on my interview, patient says her SOB is at baseline. She is usually not very mobile and gets SIMONS easily, so this is not new for her. O2sat within normal with her very likely diagnosis of COPD.     Her CTA findings are nondiagnostic for PE but highly suggestive of lung cancer, which in turn would increase the risk of PE. USS DVT study negative. D-dimer elevation can be explained by possible lung cancer.  Her SOB/SIMONS is probably a mix of her existing lung disease, deconditioning, obesity hypoventilation, and ?cancer.   Patient appears clinically well.  My  suspicion for PE is moderate at best.  If she has a PE, PESI score is 102 which is intermediate risk.  Troponin and BNP wnl    Plan  - DVT prophylaxis for now, electing not to start therapeutic anticoagulation as patient appears clinically well  - Has received IV suboptimal contrast 2x today  - Needs to wait another 24hr before attempting CTA again  - VQ scan would not be helpful in this patient  - Echocardiogram for ?right heart strain, a bedside echo could not be done given body habitus  - Duonebs Q6     Hemoptysis  See SIMONS. Suspect 2/2 lung mass.    Mass of right lung  Newly detected in this ex-heavy smoker.  See CTA.   Highly suspicious for lung cancer.     - Will need outpatient follow up with pulmonology and IR    Sarcoidosis  Not sure about this diagnosis, as no prior records can be viewed, pt unable to tell me with any certainty which organs are affected.    - F/u with her provider at [facility]  - Do not think her lung mass is sarcoid    Anemia  No prior labs for comparison.    - Checking iron panel, B12 folate and reticulocytes  - Goal HB >7    Morbid obesity with body mass index of 60.0-69.9 in adult  Body mass index is 64.72 kg/m². Morbid obesity complicates all aspects of disease management from diagnostic modalities to treatment. Weight loss encouraged and health benefits explained to patient.     Complicates all aspects of care.    COPD (chronic obstructive pulmonary disease)  Highly suspect pt with this but no prior PFTs on file. Patient also states she has asthma.    - Duonebs Q6  - Does not appear to be decompensated  - Goal O2 sat 88-92  - Probably would benefit from a 6MWT prior to discharge it pt able to walk    Hypertension  Pt reports she takes HCTZ-triamterene at home for fluid in her legs, though her BP is significantly elevated and she looks euvolemic to me. Last took this on Thursday.     - Resume HCTZ 25mg daily  - Pt reports some incontinence, if HCTZ cannot be tolerated, could try CCB  instead  - Monitor renal function, we have no prior and her admission Cr is 1.2      VTE Risk Mitigation (From admission, onward)           Ordered     enoxaparin injection 60 mg  Every 12 hours         12/20/22 0607     IP VTE HIGH RISK PATIENT  Once         12/20/22 0350     Place sequential compression device  Until discontinued         12/20/22 0350                       Radha Cortes MD  Department of Hospital Medicine   [facility] - Emergency Dept

## 2022-12-20 NOTE — ASSESSMENT & PLAN NOTE
Newly detected in this ex-heavy smoker.  See CTA.   Highly suspicious for lung cancer.     - Will need outpatient follow up with pulmonology and IR

## 2022-12-20 NOTE — ASSESSMENT & PLAN NOTE
Highly suspect pt with this but no prior PFTs on file. Patient also states she has asthma.    - Duonebs Q6  - Does not appear to be decompensated  - Goal O2 sat 88-92  - Probably would benefit from a 6MWT prior to discharge it pt able to walk

## 2022-12-20 NOTE — SUBJECTIVE & OBJECTIVE
Past Medical History:   Diagnosis Date    COPD (chronic obstructive pulmonary disease)     Essential (primary) hypertension     Mild intermittent asthma, uncomplicated     Obesity, unspecified     Sarcoidosis, unspecified        History reviewed. No pertinent surgical history.    Review of patient's allergies indicates:  No Known Allergies    No current facility-administered medications on file prior to encounter.     Current Outpatient Medications on File Prior to Encounter   Medication Sig    albuterol (PROVENTIL HFA) 90 mcg/actuation inhaler Inhale 2 puffs into the lungs every 6 (six) hours as needed for Wheezing. Rescue    meloxicam (MOBIC) 15 MG tablet Take 15 mg by mouth once daily.    mometasone-formoterol (DULERA) 200-5 mcg/actuation inhaler Inhale 2 puffs into the lungs 2 (two) times daily. Controller    triamterene-hydrochlorothiazide 37.5-25 mg (MAXZIDE-25) 37.5-25 mg per tablet Take 1 tablet by mouth once daily.     Family History       Problem Relation (Age of Onset)    No Known Problems Mother, Father          Tobacco Use    Smoking status: Former     Packs/day: 2.00     Years: 30.00     Pack years: 60.00     Types: Cigarettes     Quit date: 10/10/2022     Years since quittin.1    Smokeless tobacco: Never   Substance and Sexual Activity    Alcohol use: Not Currently    Drug use: Not on file    Sexual activity: Not on file     Review of Systems   Constitutional:  Negative for activity change, chills, fatigue, fever and unexpected weight change.   HENT: Negative.     Eyes: Negative.    Respiratory:  Positive for shortness of breath.         Hemoptysis   Cardiovascular: Negative.    Gastrointestinal:  Negative for abdominal distention and abdominal pain.   Genitourinary:  Positive for frequency (urinary incontinence). Negative for difficulty urinating.   Musculoskeletal: Negative.    Neurological: Negative.    Psychiatric/Behavioral: Negative.     Objective:     Vital Signs (Most Recent):  Temp: 98.2  °F (36.8 °C) (12/20/22 0303)  Pulse: 94 (12/20/22 0303)  Resp: (!) 23 (12/20/22 0303)  BP: (!) 152/74 (12/20/22 0303)  SpO2: 100 % (12/20/22 0303)   Vital Signs (24h Range):  Temp:  [97.8 °F (36.6 °C)-98.2 °F (36.8 °C)] 98.2 °F (36.8 °C)  Pulse:  [85-99] 94  Resp:  [16-24] 23  SpO2:  [91 %-100 %] 100 %  BP: (152-189)/(74-93) 152/74     Weight: (!) 181.9 kg (401 lb)  Body mass index is 64.72 kg/m².    Physical Exam  Constitutional:       General: She is not in acute distress.     Appearance: She is obese. She is not ill-appearing.   HENT:      Mouth/Throat:      Mouth: Mucous membranes are moist.   Eyes:      Extraocular Movements: Extraocular movements intact.      Pupils: Pupils are equal, round, and reactive to light.   Cardiovascular:      Rate and Rhythm: Normal rate and regular rhythm.      Pulses: Normal pulses.      Heart sounds: Normal heart sounds.   Pulmonary:      Effort: Pulmonary effort is normal. No respiratory distress.      Breath sounds: Wheezing (bilaterally, R>L) present. No rhonchi or rales.   Abdominal:      Palpations: Abdomen is soft.      Tenderness: There is no abdominal tenderness.   Musculoskeletal:         General: No swelling.      Right lower leg: No edema.      Left lower leg: No edema.   Skin:     General: Skin is warm and dry.   Neurological:      Mental Status: She is alert and oriented to person, place, and time. Mental status is at baseline.   Psychiatric:         Mood and Affect: Mood normal.         CRANIAL NERVES     CN III, IV, VI   Pupils are equal, round, and reactive to light.     Significant Labs: All pertinent labs within the past 24 hours have been reviewed.  CBC:   Recent Labs   Lab 12/19/22  1356   WBC 8.26   HGB 9.6*   HCT 33.6*        CMP:   Recent Labs   Lab 12/19/22  1356      K 4.2   CL 99   CO2 26      BUN 7*   CREATININE 1.2   CALCIUM 9.5   PROT 8.9*   ALBUMIN 3.4*   BILITOT 0.4   ALKPHOS 73   AST 20   ALT 9*   ANIONGAP 11     Troponin:    Recent Labs   Lab 12/19/22  1356   TROPONINI <0.006       Significant Imaging: I have reviewed all pertinent imaging results/findings within the past 24 hours.    CTA chest non coronary:  Impression:     1. Examination is nondiagnostic for pulmonary embolism.  Consider repeating the study if clinically indicated.  2. Large spiculated masslike opacity in the right upper lobe with surrounding tree-in-bud nodularity extending to the right lung apex, and occlusion of the right upper lobe bronchus.  Findings are concerning for a neoplastic process.  Recommend further evaluation with PET-CT/tissue sampling, versus three-month follow-up chest CT at a minimum.

## 2022-12-20 NOTE — DISCHARGE INSTRUCTIONS
The CT scan of your chest revealed a concerning finding that will need to be followed up with your PCP.  You may review the results in your electronic medical record but the impression reads as: Large spiculated masslike opacity in the right upper lobe with surrounding tree-in-bud nodularity extending to the right lung apex, and occlusion of the right upper lobe bronchus.  Findings are concerning for a neoplastic process.  Recommend further evaluation with PET-CT/tissue sampling, versus three-month follow-up chest CT at a minimum.

## 2022-12-20 NOTE — ED NOTES
Assumed care of patient from ANA Grier.   Patient is sitting in bed watching tv.    Requesting purewick to combat getting up to use restroom repeatedly as she is sob.  She denies any other issues and concerns.

## 2022-12-20 NOTE — ASSESSMENT & PLAN NOTE
No prior labs for comparison.    - Checking iron panel, B12 folate and reticulocytes  - Goal HB >7

## 2022-12-20 NOTE — PHARMACY MED REC
"Admission Medication History     The home medication history was taken by Yenny Resendiz.    You may go to "Admission" then "Reconcile Home Medications" tabs to review and/or act upon these items.     The home medication list has been updated by the Pharmacy department.   Please read ALL comments highlighted in yellow.   Please address this information as you see fit.    Feel free to contact us if you have any questions or require assistance.        Medications listed below were obtained from: Patient/family and Analytic software- [employer]  Current Outpatient Medications on File Prior to Encounter   Medication Sig    albuterol (PROVENTIL) 2.5 mg /3 mL (0.083 %) nebulizer solution   Take 2.5 mg by nebulization every 6 (six) hours as needed for Wheezing or Shortness of Breath.    albuterol (PROVENTIL/VENTOLIN HFA) 90 mcg/actuation inhaler   Inhale 2 puffs into the lungs every 6 (six) hours as needed for Wheezing. Rescue    levothyroxine (TIROSINT) 100 mcg Cap   Take 1 capsule by mouth once daily.    meloxicam (MOBIC) 15 MG tablet   Take 15 mg by mouth once daily.    mometasone-formoterol (DULERA) 200-5 mcg/actuation inhaler   Inhale 2 puffs into the lungs 2 (two) times daily. Controller    triamterene-hydrochlorothiazide 37.5-25 mg (MAXZIDE-25) 37.5-25 mg per tablet Take 1 tablet by mouth once daily.             Yenny Resendiz  EXT [id number]                  .        "

## 2022-12-20 NOTE — ASSESSMENT & PLAN NOTE
Body mass index is 64.72 kg/m². Morbid obesity complicates all aspects of disease management from diagnostic modalities to treatment. Weight loss encouraged and health benefits explained to patient.     Complicates all aspects of care.

## 2022-12-20 NOTE — CONSULTS
Pulmonary Consult Note    Inpatient consult to Pulmonology  Consult performed by: Tomas Velasquez MD  Consult ordered by: Ryan Parra DO  Reason for consult: RUL mass  Assessment/Recommendations:     1. RUL pulmonary mass  - close to 10cm in size in single dimension  - no obvious hilar/mediastinal adenopathy, though not contrasted for LAD  - patient is morbidly obese, making simple bronchoscopy/EBUS with moderate sedation very difficult, and is unlikely patient would fit on our bronchoscopy table  - possible presence of cervical adenopathy, will evaluate further with US of the neck  - if cervical lymph nodes are found, would consult surgery for core needle biopsy of these nodes first  - if no cervical nodes are amenable to biopsy, will discuss with colleagues regarding OR EBUS biopsy  - would likely need to hold Lovenox day prior to any procedure    2. Morbid obesity  - as above    3. Tobacco use  - quit 2 months ago    Patient seen and discussed with Dr. Vinson. Pulmonary will follow up.    Tomas Velasquez MD  Pulmonary / Critical Care      SUBJECTIVE:     History of Present Illness:  Patient is a 62 y.o. female with morbid obesity (BMI 65), tobacco use, reported COPD (no PFTs available for review) presents with cough, minute hemoptysis and shortness of breath. Workup notable for large RUL mass on CT imaging. Pulmonary consulted for assistance.    Evaluated at bedside, patient sitting up in bed, no distress. On 2L NC that was subsequently discontinued with stable O2 saturations. Reports symptoms of dyspnea, cough and minimal hemotpysis begin Thursday of last week. Symptoms persisted up to Monday, prompting her to be evaluated in the ER. Smoker for 60 years, but quit October of this year. [facility]. Lives with daughter who provides most of her care as patient is unable to perform ADLs due to body habitus.    CT chest notable for spiculated masslike opacity in RUL with surrounding  tree-in-bud nodularity. This opacity extends across the major fissure into the superior segment of the RLL. No associated pleural effusion. No readily apparent lymphadenopathy.    Review of patient's allergies indicates:  No Known Allergies  Past Medical History:   Diagnosis Date    COPD (chronic obstructive pulmonary disease)     Essential (primary) hypertension     Mild intermittent asthma, uncomplicated     Obesity, unspecified     Sarcoidosis, unspecified      History reviewed. No pertinent surgical history.  Family History   Problem Relation Age of Onset    No Known Problems Mother     No Known Problems Father     Cancer Neg Hx      Social History     Tobacco Use    Smoking status: Former     Packs/day: 2.00     Years: 30.00     Pack years: 60.00     Types: Cigarettes     Quit date: 10/10/2022     Years since quittin.1    Smokeless tobacco: Never   Substance Use Topics    Alcohol use: Not Currently     Review of Systems   Constitutional:  Negative for chills, fever and weight loss.   HENT:  Negative for congestion and sinus pain.    Eyes:  Negative for double vision and pain.   Respiratory:  Positive for cough, hemoptysis, sputum production and shortness of breath. Negative for wheezing.    Cardiovascular:  Negative for chest pain, palpitations and leg swelling.   Gastrointestinal:  Negative for abdominal pain, diarrhea, nausea and vomiting.   Genitourinary:  Negative for dysuria and hematuria.   Musculoskeletal:  Positive for back pain and joint pain.   Skin:  Negative for itching and rash.   Neurological:  Negative for focal weakness, seizures and weakness.   OBJECTIVE:     Vital Signs:  Temp:  [98.2 °F (36.8 °C)]   Pulse:  [90-94]   Resp:  [20-23]   BP: (152)/(74)   SpO2:  [97 %-100 %]     Physical Exam  Constitutional:       General: She is not in acute distress.     Appearance: She is obese. She is not ill-appearing or toxic-appearing.   HENT:      Head: Normocephalic and atraumatic.      Mouth/Throat:       Mouth: Mucous membranes are moist.      Pharynx: Oropharynx is clear.   Eyes:      Extraocular Movements: Extraocular movements intact.      Pupils: Pupils are equal, round, and reactive to light.   Neck:      Comments: Exam for cervical adenopathy difficult given body habitus; questionable enlarged lymph node along right anterior cervical chain; unable to appreciate any supraclavicular or axillary LAD  Cardiovascular:      Rate and Rhythm: Normal rate and regular rhythm.   Pulmonary:      Effort: Pulmonary effort is normal. No respiratory distress.      Breath sounds: No wheezing, rhonchi or rales.   Abdominal:      Palpations: Abdomen is soft.      Tenderness: There is no abdominal tenderness. There is no guarding.   Musculoskeletal:         General: No signs of injury. Normal range of motion.      Cervical back: Normal range of motion and neck supple. No rigidity.   Skin:     General: Skin is warm and dry.   Neurological:      General: No focal deficit present.      Mental Status: She is oriented to person, place, and time.   Psychiatric:         Mood and Affect: Mood normal.         Behavior: Behavior normal.     Laboratory:  I have reviewed all pertinent lab results within the past 24 hours.    Diagnostic Results:  Labs: Reviewed  X-Ray: Reviewed  CT: Reviewed    ASSESSMENT/PLAN:     See above

## 2022-12-20 NOTE — ED NOTES
"Ara Duffy, a 62 y.o. female presents to the ED w/ complaint of some blood in sputum post cough x appx 1 week.     Triage note:  Chief Complaint   Patient presents with    Hemoptysis     Pt c/o "coughing up blood, SOB and headache" since Thursday.       Review of patient's allergies indicates:  No Known Allergies  History reviewed. No pertinent past medical history.   "

## 2022-12-21 VITALS
WEIGHT: 293 LBS | HEIGHT: 66 IN | DIASTOLIC BLOOD PRESSURE: 67 MMHG | TEMPERATURE: 98 F | OXYGEN SATURATION: 98 % | HEART RATE: 80 BPM | RESPIRATION RATE: 19 BRPM | BODY MASS INDEX: 47.09 KG/M2 | SYSTOLIC BLOOD PRESSURE: 138 MMHG

## 2022-12-21 LAB
ALBUMIN SERPL BCP-MCNC: 3.3 G/DL (ref 3.5–5.2)
ALP SERPL-CCNC: 65 U/L (ref 55–135)
ALT SERPL W/O P-5'-P-CCNC: 10 U/L (ref 10–44)
ANION GAP SERPL CALC-SCNC: 6 MMOL/L (ref 8–16)
AST SERPL-CCNC: 17 U/L (ref 10–40)
BASOPHILS # BLD AUTO: 0.06 K/UL (ref 0–0.2)
BASOPHILS NFR BLD: 0.7 % (ref 0–1.9)
BILIRUB SERPL-MCNC: 0.4 MG/DL (ref 0.1–1)
BUN SERPL-MCNC: 20 MG/DL (ref 8–23)
CALCIUM SERPL-MCNC: 9.3 MG/DL (ref 8.7–10.5)
CHLORIDE SERPL-SCNC: 102 MMOL/L (ref 95–110)
CO2 SERPL-SCNC: 31 MMOL/L (ref 23–29)
CREAT SERPL-MCNC: 1.4 MG/DL (ref 0.5–1.4)
DIFFERENTIAL METHOD: ABNORMAL
EOSINOPHIL # BLD AUTO: 0 K/UL (ref 0–0.5)
EOSINOPHIL NFR BLD: 0.4 % (ref 0–8)
ERYTHROCYTE [DISTWIDTH] IN BLOOD BY AUTOMATED COUNT: 18 % (ref 11.5–14.5)
EST. GFR  (NO RACE VARIABLE): 42.5 ML/MIN/1.73 M^2
GLUCOSE SERPL-MCNC: 121 MG/DL (ref 70–110)
HCT VFR BLD AUTO: 32.7 % (ref 37–48.5)
HGB BLD-MCNC: 9.2 G/DL (ref 12–16)
IMM GRANULOCYTES # BLD AUTO: 0.03 K/UL (ref 0–0.04)
IMM GRANULOCYTES NFR BLD AUTO: 0.4 % (ref 0–0.5)
LYMPHOCYTES # BLD AUTO: 2.3 K/UL (ref 1–4.8)
LYMPHOCYTES NFR BLD: 28.2 % (ref 18–48)
MAGNESIUM SERPL-MCNC: 2.1 MG/DL (ref 1.6–2.6)
MCH RBC QN AUTO: 21.7 PG (ref 27–31)
MCHC RBC AUTO-ENTMCNC: 28.1 G/DL (ref 32–36)
MCV RBC AUTO: 77 FL (ref 82–98)
MONOCYTES # BLD AUTO: 0.7 K/UL (ref 0.3–1)
MONOCYTES NFR BLD: 8.6 % (ref 4–15)
NEUTROPHILS # BLD AUTO: 5 K/UL (ref 1.8–7.7)
NEUTROPHILS NFR BLD: 61.7 % (ref 38–73)
NRBC BLD-RTO: 0 /100 WBC
PHOSPHATE SERPL-MCNC: 3.4 MG/DL (ref 2.7–4.5)
PLATELET # BLD AUTO: 358 K/UL (ref 150–450)
PMV BLD AUTO: 10.5 FL (ref 9.2–12.9)
POTASSIUM SERPL-SCNC: 4 MMOL/L (ref 3.5–5.1)
PROT SERPL-MCNC: 8.2 G/DL (ref 6–8.4)
RBC # BLD AUTO: 4.24 M/UL (ref 4–5.4)
SODIUM SERPL-SCNC: 139 MMOL/L (ref 136–145)
WBC # BLD AUTO: 8.12 K/UL (ref 3.9–12.7)

## 2022-12-21 PROCEDURE — 85025 COMPLETE CBC W/AUTO DIFF WBC: CPT

## 2022-12-21 PROCEDURE — 96372 THER/PROPH/DIAG INJ SC/IM: CPT

## 2022-12-21 PROCEDURE — 80053 COMPREHEN METABOLIC PANEL: CPT

## 2022-12-21 PROCEDURE — 83735 ASSAY OF MAGNESIUM: CPT

## 2022-12-21 PROCEDURE — 94761 N-INVAS EAR/PLS OXIMETRY MLT: CPT

## 2022-12-21 PROCEDURE — 99225 PR SUBSEQUENT OBSERVATION CARE,LEVEL II: ICD-10-PCS | Mod: ,,, | Performed by: HOSPITALIST

## 2022-12-21 PROCEDURE — 94640 AIRWAY INHALATION TREATMENT: CPT | Mod: XB

## 2022-12-21 PROCEDURE — 99225 PR SUBSEQUENT OBSERVATION CARE,LEVEL II: CPT | Mod: ,,, | Performed by: HOSPITALIST

## 2022-12-21 PROCEDURE — 63600175 PHARM REV CODE 636 W HCPCS

## 2022-12-21 PROCEDURE — 36415 COLL VENOUS BLD VENIPUNCTURE: CPT

## 2022-12-21 PROCEDURE — 84100 ASSAY OF PHOSPHORUS: CPT

## 2022-12-21 PROCEDURE — G0378 HOSPITAL OBSERVATION PER HR: HCPCS

## 2022-12-21 PROCEDURE — 25000003 PHARM REV CODE 250

## 2022-12-21 PROCEDURE — 99214 OFFICE O/P EST MOD 30 MIN: CPT | Mod: ,,, | Performed by: INTERNAL MEDICINE

## 2022-12-21 PROCEDURE — 25000242 PHARM REV CODE 250 ALT 637 W/ HCPCS: Performed by: STUDENT IN AN ORGANIZED HEALTH CARE EDUCATION/TRAINING PROGRAM

## 2022-12-21 PROCEDURE — 99214 PR OFFICE/OUTPT VISIT, EST, LEVL IV, 30-39 MIN: ICD-10-PCS | Mod: ,,, | Performed by: INTERNAL MEDICINE

## 2022-12-21 RX ORDER — IPRATROPIUM BROMIDE AND ALBUTEROL SULFATE 2.5; .5 MG/3ML; MG/3ML
3 SOLUTION RESPIRATORY (INHALATION) EVERY 6 HOURS PRN
Qty: 90 ML | Refills: 0 | Status: SHIPPED | OUTPATIENT
Start: 2022-12-21 | End: 2023-12-21

## 2022-12-21 RX ORDER — LEVOTHYROXINE SODIUM 100 UG/1
100 TABLET ORAL
Status: DISCONTINUED | OUTPATIENT
Start: 2022-12-21 | End: 2022-12-21 | Stop reason: HOSPADM

## 2022-12-21 RX ORDER — LEVOTHYROXINE SODIUM 100 UG/1
1 CAPSULE ORAL DAILY
Status: DISCONTINUED | OUTPATIENT
Start: 2022-12-21 | End: 2022-12-21

## 2022-12-21 RX ORDER — ALBUTEROL SULFATE 90 UG/1
2 AEROSOL, METERED RESPIRATORY (INHALATION) EVERY 6 HOURS PRN
Status: DISCONTINUED | OUTPATIENT
Start: 2022-12-21 | End: 2022-12-21 | Stop reason: HOSPADM

## 2022-12-21 RX ORDER — IPRATROPIUM BROMIDE AND ALBUTEROL SULFATE 2.5; .5 MG/3ML; MG/3ML
3 SOLUTION RESPIRATORY (INHALATION)
Status: DISCONTINUED | OUTPATIENT
Start: 2022-12-21 | End: 2022-12-21 | Stop reason: HOSPADM

## 2022-12-21 RX ORDER — AMOXICILLIN AND CLAVULANATE POTASSIUM 875; 125 MG/1; MG/1
1 TABLET, FILM COATED ORAL EVERY 12 HOURS
Qty: 10 TABLET | Refills: 0 | Status: SHIPPED | OUTPATIENT
Start: 2022-12-21 | End: 2022-12-22

## 2022-12-21 RX ADMIN — ALBUTEROL SULFATE 2 PUFF: 108 INHALANT RESPIRATORY (INHALATION) at 10:12

## 2022-12-21 RX ADMIN — HYDROCHLOROTHIAZIDE 25 MG: 25 TABLET ORAL at 10:12

## 2022-12-21 RX ADMIN — ENOXAPARIN SODIUM 60 MG: 40 INJECTION SUBCUTANEOUS at 10:12

## 2022-12-21 RX ADMIN — IPRATROPIUM BROMIDE AND ALBUTEROL SULFATE 3 ML: 2.5; .5 SOLUTION RESPIRATORY (INHALATION) at 02:12

## 2022-12-21 RX ADMIN — IPRATROPIUM BROMIDE AND ALBUTEROL SULFATE 3 ML: 2.5; .5 SOLUTION RESPIRATORY (INHALATION) at 08:12

## 2022-12-21 NOTE — ASSESSMENT & PLAN NOTE
Highly suspect pt with this but no prior PFTs on file. Patient also states she has asthma.    - Duonebs Q6  - Does not appear to be decompensated  - Goal O2 sat 88-92

## 2022-12-21 NOTE — PLAN OF CARE
[facility] - [facility] ([facility])  Discharge Assessment    Primary Care Provider: Primary Doctor No     Discharge Assessment (most recent)       BRIEF DISCHARGE ASSESSMENT - 12/21/22 1416          Discharge Planning    Assessment Type Discharge Planning Brief Assessment (P)      Resource/Environmental Concerns none (P)      Support Systems Children (P)      Equipment Currently Used at Home nebulizer (P)      Current Living Arrangements home (P)      Patient/Family Anticipates Transition to home (P)      Patient/Family Anticipated Services at Transition none (P)      DME Needed Upon Discharge  none (P)      Discharge Plan A Home (P)

## 2022-12-21 NOTE — HOSPITAL COURSE
62 year old with prior extensive smoking history (60 pack years, stopped this year), questionable sarcoidosis (treated with prednisone 20-30 years ago), HTN, COPD, morbid obesity who presents to [facility] [place] for SIMONS and <1 teaspoon amounts of hemoptysis that has been ongoing for the past several weeks. Has SIMONS at baseline. In the ED hemodynamically stable, Hgb of 9.6, stable throughout admission. Initial concern was for pulmonary embolism. CT chest revealed large spiculated lung mass of the right upper lobe with surrounding tree-in-bud nodularity, however unable to rule out PE. Well's score 1.5, low risk at this time, not requiring supplemental oxygen, decision was made to hold off on re-scan for PE. Pulmonology was consulted for biopsy under EBUS/bronchoscopy. Neck ultrasound obtained to search for biopsy-able lymph nodes, however no malignant nodes were found. Due to multiple co-morbidities, decision was made to biopsy in the OR w/ anesthesia care. Patient will be discharged home with nebulizer and home medication. Pulm will coordinate with anesthesia and OR staff and contact patient with biopsy date.

## 2022-12-21 NOTE — ASSESSMENT & PLAN NOTE
Newly detected in this ex-heavy smoker.  See CTA.   Highly suspicious for malignancy.     - Outpatient f/u with pulm for biopsy.

## 2022-12-21 NOTE — ASSESSMENT & PLAN NOTE
Body mass index is 64.72 kg/m². Morbid obesity complicates all aspects of disease management from diagnostic modalities to treatment. Weight loss encouraged and health benefits explained to patient. Complicating biopsy due to morbid obesity and COPD.

## 2022-12-21 NOTE — ASSESSMENT & PLAN NOTE
63 yo F with unclear PMHx including asthma vs COPD, sarcoidosis, presenting with several days of SOB/SIMONS and hemoptysis. However on my interview, patient says her SOB is at baseline. She is usually not very mobile and gets SIMONS easily, so this is not new for her. O2sat within normal with her very likely diagnosis of COPD.     Her CTA findings are nondiagnostic for PE but highly suggestive of lung cancer, which in turn would increase the risk of PE. USS DVT study negative. D-dimer elevation can be explained by possible lung cancer.  Her SOB/SIMONS is probably a mix of her existing lung disease, deconditioning, obesity hypoventilation, and ?cancer.   Patient appears clinically well.  My suspicion for PE is moderate at best.  If she has a PE, PESI score is 102 which is intermediate risk.  Troponin and BNP wnl    Plan  - DVT prophylaxis for now, electing not to start therapeutic anticoagulation as patient appears clinically well  - Has received IV suboptimal contrast 2x today  - Will hold off on re-scanning, wells score 1.5, not tachycardic, not tachypneic, hemoptysis has resolved. Most likely lung mass attributing to hemoptysis.   - Echo with normal septum which argues against RV strain, however technically difficult study  - Halle Q6, will discharge with

## 2022-12-21 NOTE — SUBJECTIVE & OBJECTIVE
Objective:     Vital Signs (Most Recent):  Temp: 97.6 °F (36.4 °C) (12/21/22 1128)  Pulse: 91 (12/21/22 1128)  Resp: 20 (12/21/22 1128)  BP: 138/67 (12/21/22 1128)  SpO2: (!) 89 % (12/21/22 1128)   Vital Signs (24h Range):  Temp:  [97.4 °F (36.3 °C)-98.9 °F (37.2 °C)] 97.6 °F (36.4 °C)  Pulse:  [80-95] 91  Resp:  [18-20] 20  SpO2:  [86 %-98 %] 89 %  BP: (126-154)/(55-68) 138/67     Weight: (!) 181.9 kg (401 lb)  Body mass index is 64.72 kg/m².    Intake/Output Summary (Last 24 hours) at 12/21/2022 1333  Last data filed at 12/21/2022 0616  Gross per 24 hour   Intake --   Output 400 ml   Net -400 ml      Physical Exam  Constitutional:       Appearance: Normal appearance. She is obese.   HENT:      Head: Normocephalic and atraumatic.      Nose: Nose normal.      Mouth/Throat:      Mouth: Mucous membranes are moist.   Eyes:      Extraocular Movements: Extraocular movements intact.      Pupils: Pupils are equal, round, and reactive to light.   Cardiovascular:      Rate and Rhythm: Normal rate and regular rhythm.      Pulses: Normal pulses.      Heart sounds: Normal heart sounds.   Pulmonary:      Effort: Pulmonary effort is normal. No respiratory distress.      Breath sounds: Wheezing present.      Comments: Decreased breath sounds  Abdominal:      General: Abdomen is flat. There is no distension.      Tenderness: There is no abdominal tenderness.   Musculoskeletal:         General: Normal range of motion.      Cervical back: Normal range of motion.   Skin:     General: Skin is warm and dry.   Neurological:      General: No focal deficit present.      Mental Status: She is alert and oriented to person, place, and time.   Psychiatric:         Mood and Affect: Mood normal.         Behavior: Behavior normal.       Significant Labs: All pertinent labs within the past 24 hours have been reviewed.    Significant Imaging: I have reviewed all pertinent imaging results/findings within the past 24 hours.

## 2022-12-21 NOTE — ASSESSMENT & PLAN NOTE
No prior labs for comparison.    - Checking iron panel, B12 folate and reticulocytes. WNL  - Goal HB >7

## 2022-12-21 NOTE — PROGRESS NOTES
"Ara Duffy is a 62 y.o. female patient with morbid obesity (BMI 65), tobacco use, reported COPD (no PFTs available for review) presents with cough, minute hemoptysis and shortness of breath. Workup notable for large RUL mass on CT imaging. Pulmonary consulted for assistance.    Active Hospital Problems    Diagnosis  POA    *Dyspnea on exertion [R06.09]  Unknown    Hypertension [I10]  Unknown     Chronic    COPD (chronic obstructive pulmonary disease) [J44.9]  Unknown    Morbid obesity with body mass index of 60.0-69.9 in adult [E66.01, Z68.44]  Not Applicable     Chronic    Mass of right lung [R91.8]  Unknown    Hemoptysis [R04.2]  Unknown    Anemia [D64.9]  Unknown    Sarcoidosis [D86.9]  Unknown      Resolved Hospital Problems   No resolved problems to display.     Temp: 97.4 °F (36.3 °C) (12/21/22 0745)  Pulse: 91 (12/21/22 0745)  Resp: 18 (12/21/22 0745)  BP: 139/62 (12/21/22 0745)  SpO2: (!) 92 % (12/21/22 0752)  Weight: (!) 181.9 kg (401 lb) (12/19/22 1226)  Height: 5' 6" (167.6 cm) (12/19/22 1226)    Subjective  US neck without pathologic lymph nodes. Discussed next steps with patient, which would be further discussion with our proceduralists regarding timing of bronch/EBUS in OR. Patient expresses understanding. Understands that she will be discharged home in the meantime. No need for oxygen therapy, O2 sats remain 97% and above at rest on room air.    Objective:  General Appearance:  Comfortable, in no acute distress and not in pain.    Vital signs: (most recent): Blood pressure 138/67, pulse 80, temperature 97.6 °F (36.4 °C), resp. rate 19, height 5' 6" (1.676 m), weight (!) 181.9 kg (401 lb), SpO2 98 %, not currently breastfeeding.  Vital signs are normal.    Output: Producing urine.    Lungs:  Normal effort and normal respiratory rate.  She is not in respiratory distress.  No stridor.  There are decreased breath sounds (throughout due to body habitus).  No wheezes.    Heart: Normal rate.  Regular rhythm. "    Abdomen: Abdomen is soft.  There is no abdominal tenderness.     Extremities: (Obese, difficult to determine fluid status)  Neurological: Patient is alert and oriented to person, place and time.    Skin:  Warm and dry.        Assessment & Plan    1. RUL pulmonary mass  - close to 10cm in size in single dimension  - no obvious hilar/mediastinal adenopathy, though not contrasted for LAD  - patient is morbidly obese, making bronchoscopy/EBUS with moderate sedation very difficult, and unlikely patient would fit on our bronchoscopy table  - no pathologic LAD noted on neck US  - will discuss further with colleagues regarding timing of OR EBUS sampling     2. Morbid obesity  - as above     3. Tobacco use  - quit 2 months ago     Patient seen and discussed with Dr. Vinson. Patient okay to discharge from pulmonary perspective. Will follow up with patient regarding biopsy.     Tomas Velasquez MD  Pulmonary / Critical Care    Tomas Velasquez MD  12/21/2022

## 2022-12-21 NOTE — PLAN OF CARE
Pt is AAOx4,VS WNL on room air. Pt with orders to d/c IV and d/c home. Tolerated d/c of Iv. Awake ,alert, and oriented with no acute distress noted . Reviewed discharge orders including : medicine orders, prescriptions, followup appts, and patient education materials for diet and diagnosis. Belongings packed for transport  to home. Ambulating out at discharge via wheelchair by son

## 2022-12-21 NOTE — DISCHARGE SUMMARY
"[facility] - [facility] ([place] [address])  [facility] Medicine  Discharge Summary      Patient Name: Ara Duffy  MRN: [id number]  ROBIN: [id number]  Patient Class: OP- Observation  Admission Date: 12/19/2022  Hospital Length of Stay: 0 days  Discharge Date and Time:  12/21/2022 1:42 PM  Attending Physician: Beto Dietz MD   Discharging Provider: Ryan Parra DO  Primary Care Provider: No primary care provider on file.  [place]ospital Medicine [place] [facility] Ryan Parra DO  Primary Care Team: [facility]    HPI:   Ara Duffy is a 62 year old woman with ?sarcoidosis, asthma/COPD, HTN, who presents for hemoptysis and SIMONS that started Thursday. Patient has never been in the hospital and receives most of her care at [facility], which we cannot see.     Since Thursday patient has noted small streaks of blood in her sputum, about <1 tsp. Denies shortness of breath at rest, but has SIMONS which is normal for her. Patient has not noted significant changes in the quality of sputum otherwise, eg quantity or colour. Denies fevers, chills, weight loss recently, night sweats, but notes some decreased appetite in the last month. Patient unable to clarify her diagnosis of sarcoidosis. She states that sarcoidosis has affected parts of her skin by causing excess tissue, but cannot say if she has lung or cardiac involvement of sarcoid.    Patient is an ex smoker with about 60 pack years, quit October this year. Does not drink or use other drugs. She has never undergone cancer screening but there does not seem to be a strong family history of cancer. She says she was supposed to get a "lung scan" done at [facility] next year. She lives with her daughter, who performs most of her ADLs for her. At baseline she is not very mobile.    In the ED, pt was hypertensive but had stable vitals. Labs notable for anemia and D dimer elevation >2. Given her reported history, there was concern for PE. CTA was completed but nondiagnostic owing to contrast " extravasation. CT did however, show a new R lung mass: Large spiculated masslike opacity in the right upper lobe with surrounding tree-in-bud nodularity extending to the right lung apex, and occlusion of the right upper lobe bronchus.  Findings are concerning for a neoplastic process.  Recommend further evaluation with PET-CT/tissue sampling, versus three-month follow-up chest CT at a minimum.    Given her likely malignancy and concern for PE, patient was admitted to medicine for observation.      * No surgery found *      Hospital Course:   62 year old with prior extensive smoking history (60 pack years, stopped this year), questionable sarcoidosis (treated with prednisone 20-30 years ago), HTN, COPD, morbid obesity who presents to [facility] for SIMONS and <1 teaspoon amounts of hemoptysis that has been ongoing for the past several weeks. Has SIMONS at baseline. In the ED hemodynamically stable, Hgb of 9.6, stable throughout admission. Initial concern was for pulmonary embolism. CT chest revealed large spiculated lung mass of the right upper lobe with surrounding tree-in-bud nodularity, however unable to rule out PE. Well's score 1.5, low risk at this time, not requiring supplemental oxygen, decision was made to hold off on re-scan for PE. Pulmonology was consulted for biopsy under EBUS/bronchoscopy. Neck ultrasound obtained to search for biopsy-able lymph nodes, however no malignant nodes were found. Due to multiple co-morbidities, decision was made to biopsy in the OR w/ anesthesia care. Patient will be discharged home with nebulizer and home medication. Pulm will coordinate with anesthesia and OR staff and contact patient with biopsy date.        Goals of Care Treatment Preferences:  Code Status: Full Code      Consults:   Consults (From admission, onward)        Status Ordering Provider     Inpatient consult to Pulmonology  Once        Provider:  (Not yet assigned)    Completed JENNIFER MCADAMS          * Dyspnea on  exertion  61 yo F with unclear PMHx including asthma vs COPD, sarcoidosis, presenting with several days of SOB/SIMONS and hemoptysis. However on my interview, patient says her SOB is at baseline. She is usually not very mobile and gets SIMONS easily, so this is not new for her. O2sat within normal with her very likely diagnosis of COPD.     Her CTA findings are nondiagnostic for PE but highly suggestive of lung cancer, which in turn would increase the risk of PE. USS DVT study negative. D-dimer elevation can be explained by possible lung cancer.  Her SOB/SIMONS is probably a mix of her existing lung disease, deconditioning, obesity hypoventilation, and ?cancer.   Patient appears clinically well.  My suspicion for PE is moderate at best.  If she has a PE, PESI score is 102 which is intermediate risk.  Troponin and BNP wnl    Plan  - DVT prophylaxis for now, electing not to start therapeutic anticoagulation as patient appears clinically well  - Has received IV suboptimal contrast 2x today  - Will hold off on re-scanning, wells score 1.5, not tachycardic, not tachypneic, hemoptysis has resolved. Most likely lung mass attributing to hemoptysis.   - Echo with normal septum which argues against RV strain, however technically difficult study  - Duonebs Q6, will discharge with      Sarcoidosis  Not sure about this diagnosis, as no prior records can be viewed, pt unable to tell me with any certainty which organs are affected.  - Currently not on therapy, has not been treated in 20 years per patient  - F/u with her provider at [facility]  - Do not think her lung mass is sarcoid    Anemia  No prior labs for comparison.    - Checking iron panel, B12 folate and reticulocytes. WNL  - Goal HB >7    Hemoptysis  See SIMONS. Suspect 2/2 lung mass.    Mass of right lung  Newly detected in this ex-heavy smoker.  See CTA.   Highly suspicious for malignancy.     - Outpatient f/u with pulm for biopsy.     Morbid obesity with body mass index of 60.0-69.9  "in adult  Body mass index is 64.72 kg/m². Morbid obesity complicates all aspects of disease management from diagnostic modalities to treatment. Weight loss encouraged and health benefits explained to patient. Complicating biopsy due to morbid obesity and COPD.     COPD (chronic obstructive pulmonary disease)  Highly suspect pt with this but no prior PFTs on file. Patient also states she has asthma.    - Duonebs Q6  - Does not appear to be decompensated  - Goal O2 sat 88-92    Hypertension  Pt reports she takes HCTZ-triamterene at home for fluid in her legs, though her BP is significantly elevated and she looks euvolemic to me. Last took this on Thursday.     - Resume HCTZ 25mg daily  - Pt reports some incontinence, if HCTZ cannot be tolerated, could try CCB instead  - Monitor renal function, we have no prior and her admission Cr is 1.2      Final Active Diagnoses:    Diagnosis Date Noted POA    PRINCIPAL PROBLEM:  Dyspnea on exertion [R06.09] 12/20/2022 Yes    Hypertension [I10] 12/20/2022 Unknown     Chronic    COPD (chronic obstructive pulmonary disease) [J44.9] 12/20/2022 Unknown    Morbid obesity with body mass index of 60.0-69.9 in adult [E66.01, Z68.44] 12/20/2022 Not Applicable     Chronic    Mass of right lung [R91.8] 12/20/2022 Yes    Hemoptysis [R04.2] 12/20/2022 Yes    Anemia [D64.9] 12/20/2022 Yes    Sarcoidosis [D86.9] 12/20/2022 Yes      Problems Resolved During this Admission:       Discharged Condition: fair    Disposition:     Follow Up:   Follow-up Information     [facility] - Emergency Dept.    Specialty: Emergency Medicine  Why: As needed, If symptoms worsen  Contact information:  [address]  [facility] [address] [phone]                     Patient Instructions:      NEBULIZER FOR HOME USE     Order Specific Question Answer Comments   Height: 5' 6" (1.676 m)    Weight: 181.9 kg (401 lb)    Length of need (1-99 months): 99      Ambulatory referral/consult to " Pulmonology   Standing Status: Future   Referral Priority: Routine Referral Type: Consultation   Referral Reason: Specialty Services Required   Requested Specialty: Pulmonary Disease   Number of Visits Requested: 1       Significant Diagnostic Studies: Labs:   CMP   Recent Labs   Lab 12/19/22  1356 12/20/22  0507 12/21/22  0527    137 139   K 4.2 4.5 4.0   CL 99 101 102   CO2 26 24 31*    145* 121*   BUN 7* 13 20   CREATININE 1.2 1.3 1.4   CALCIUM 9.5 9.5 9.3   PROT 8.9* 9.0* 8.2   ALBUMIN 3.4* 3.4* 3.3*   BILITOT 0.4 0.3 0.4   ALKPHOS 73 75 65   AST 20 15 17   ALT 9* 10 10   ANIONGAP 11 12 6*    and CBC   Recent Labs   Lab 12/19/22  1356 12/20/22  0507 12/21/22  0527   WBC 8.26 9.85 8.12   HGB 9.6* 10.1* 9.2*   HCT 33.6* 34.8* 32.7*    333 358       Pending Diagnostic Studies:     None         Medications:  Reconciled Home Medications:      Medication List      START taking these medications    albuterol-ipratropium 2.5 mg-0.5 mg/3 mL nebulizer solution  Commonly known as: DUO-NEB  Take 3 mLs by nebulization every 6 (six) hours as needed for Wheezing. Rescue        CONTINUE taking these medications    * albuterol 2.5 mg /3 mL (0.083 %) nebulizer solution  Commonly known as: PROVENTIL  Take 2.5 mg by nebulization every 6 (six) hours as needed for Wheezing or Shortness of Breath.     * albuterol 90 mcg/actuation inhaler  Commonly known as: PROVENTIL/VENTOLIN HFA  Inhale 2 puffs into the lungs every 6 (six) hours as needed for Wheezing. Rescue     DULERA 200-5 mcg/actuation inhaler  Generic drug: mometasone-formoterol  Inhale 2 puffs into the lungs 2 (two) times daily. Controller     levothyroxine 100 mcg Cap  Commonly known as: TIROSINT  Take 1 capsule by mouth once daily.     meloxicam 15 MG tablet  Commonly known as: MOBIC  Take 15 mg by mouth once daily.     triamterene-hydrochlorothiazide 37.5-25 mg 37.5-25 mg per tablet  Commonly known as: MAXZIDE-25  Take 1 tablet by mouth once daily.          * This list has 2 medication(s) that are the same as other medications prescribed for you. Read the directions carefully, and ask your doctor or other care provider to review them with you.                Indwelling Lines/Drains at time of discharge:   Lines/Drains/Airways     Drain  Duration           Female External Urinary Catheter 12/20/22 0542 1 day                Time spent on the discharge of patient: 35 minutes         Ryan Parra DO  Department of Hospital Medicine  [facility] - Intensive Care ([place]-[facility])

## 2022-12-21 NOTE — NURSING
Home Oxygen Evaluation    Date Performed: 2022    1) Patient's Home O2 Sat on room air, while at rest: 92%        If O2 sats on room air at rest are 88% or below, patient qualifies. No additional testing needed. Document N/A in steps 2 and 3. If 89% or above, complete steps 2.      2) Patient's O2 Sat on room air while exercisin%        If O2 sats on room air while exercising remain 89% or above patient does not qualify, no further testing needed Document N/A in step 3. If O2 sats on room air while exercising are 88% or below, continue to step 3.      3) Patient's O2 Sat while exercising on O2: n/a at n/a LPM         (Must show improvement from #2 for patients to qualify)    If O2 sats improve on oxygen, patient qualifies for portable oxygen. If not, the patient does not qualify.

## 2022-12-22 DIAGNOSIS — J44.0 CHRONIC OBSTRUCTIVE PULMONARY DISEASE WITH ACUTE LOWER RESPIRATORY INFECTION: Primary | ICD-10-CM

## 2022-12-22 RX ORDER — AMOXICILLIN AND CLAVULANATE POTASSIUM 875; 125 MG/1; MG/1
1 TABLET, FILM COATED ORAL EVERY 12 HOURS
Qty: 10 TABLET | Refills: 0 | Status: SHIPPED | OUTPATIENT
Start: 2022-12-22 | End: 2022-12-27

## 2023-01-04 ENCOUNTER — TELEPHONE (OUTPATIENT)
Dept: PULMONOLOGY | Facility: CLINIC | Age: 63
End: 2023-01-04
Payer: MEDICAID

## 2023-01-04 NOTE — TELEPHONE ENCOUNTER
"Patient "no showed" for clinic appt today for mass as per consult per Dr. Vinson.  In care everywhere, there is also an appt scheduled today for pulmonary at [facility].  "